# Patient Record
Sex: FEMALE | Race: AMERICAN INDIAN OR ALASKA NATIVE
[De-identification: names, ages, dates, MRNs, and addresses within clinical notes are randomized per-mention and may not be internally consistent; named-entity substitution may affect disease eponyms.]

---

## 2020-06-07 ENCOUNTER — HOSPITAL ENCOUNTER (EMERGENCY)
Dept: HOSPITAL 5 - ED | Age: 60
Discharge: HOME | End: 2020-06-07
Payer: COMMERCIAL

## 2020-06-07 VITALS — SYSTOLIC BLOOD PRESSURE: 157 MMHG | DIASTOLIC BLOOD PRESSURE: 79 MMHG

## 2020-06-07 DIAGNOSIS — R51: ICD-10-CM

## 2020-06-07 DIAGNOSIS — Z79.899: ICD-10-CM
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
## 2020-06-07 DIAGNOSIS — T78.49XA: Primary | ICD-10-CM

## 2020-06-07 DIAGNOSIS — Z88.8: ICD-10-CM

## 2020-06-07 PROCEDURE — 99282 EMERGENCY DEPT VISIT SF MDM: CPT

## 2020-06-07 PROCEDURE — 96372 THER/PROPH/DIAG INJ SC/IM: CPT

## 2021-01-05 ENCOUNTER — HOSPITAL ENCOUNTER (EMERGENCY)
Dept: HOSPITAL 5 - ED | Age: 61
Discharge: HOME | End: 2021-01-05
Payer: COMMERCIAL

## 2021-01-05 VITALS — DIASTOLIC BLOOD PRESSURE: 84 MMHG | SYSTOLIC BLOOD PRESSURE: 160 MMHG

## 2021-01-05 DIAGNOSIS — I10: ICD-10-CM

## 2021-01-05 DIAGNOSIS — Z79.899: ICD-10-CM

## 2021-01-05 DIAGNOSIS — M25.512: Primary | ICD-10-CM

## 2021-01-05 DIAGNOSIS — Z88.8: ICD-10-CM

## 2021-01-05 PROCEDURE — 99283 EMERGENCY DEPT VISIT LOW MDM: CPT

## 2021-01-05 NOTE — EMERGENCY DEPARTMENT REPORT
ED General Adult HPI





- General


Stated complaint: LT SHOULDER PAIN


Time Seen by Provider: 01/05/21 13:55





- History of Present Illness


Initial comments: 





60-year-old -American female patient presents with complaints of left 

shoulder pain x1 week.  She rates her pain as a 7/10 in severity and states it 

worsens with movement.  Patient denies any chest pain, shortness of breath, 

difficulty moving her left shoulder.  She states pain is relieved with i

buprofen.  No prior history of heart disease or family history of heart disease 

per patient.  Also states some intermittent tingling in her left hand.





- Related Data


                                  Previous Rx's











 Medication  Instructions  Recorded  Last Taken  Type


 


predniSONE [Deltasone] 20 mg PO QDAY 5 Days #5 tab 06/07/20 Unknown Rx


 


Naproxen 500 mg PO BID 7 Days #14 tablet 01/05/21 Unknown Rx


 


methOCARBAMOL [Robaxin TAB] 1,000 mg PO TID PRN #20 tab 01/05/21 Unknown Rx











                                    Allergies











Allergy/AdvReac Type Severity Reaction Status Date / Time


 


lisinopril Allergy  Unknown Verified 06/07/20 20:48


 


hair dye Allergy  Swelling Uncoded 06/07/20 20:48














ED Review of Systems


ROS: 


Stated complaint: LT SHOULDER PAIN


Other details as noted in HPI





Constitutional: denies: diaphoresis, fever, malaise, weakness


Respiratory: denies: cough, shortness of breath


Cardiovascular: denies: chest pain


Gastrointestinal: denies: abdominal pain


Musculoskeletal: denies: back pain


Neurological: paresthesias.  denies: headache, weakness, numbness





ED Past Medical Hx





- Past Medical History


Hx Hypertension: Yes





- Social History


Smoking Status: Never Smoker


Substance Use Type: None





- Medications


Home Medications: 


                                Home Medications











 Medication  Instructions  Recorded  Confirmed  Last Taken  Type


 


predniSONE [Deltasone] 20 mg PO QDAY 5 Days #5 tab 06/07/20  Unknown Rx


 


Naproxen 500 mg PO BID 7 Days #14 tablet 01/05/21  Unknown Rx


 


methOCARBAMOL [Robaxin TAB] 1,000 mg PO TID PRN #20 tab 01/05/21  Unknown Rx














ED Physical Exam





- General


General appearance: alert, in no apparent distress





- Head


Head exam: Present: atraumatic, normocephalic





- Eye


Eye exam: Present: normal appearance.  Absent: scleral icterus





- ENT


ENT exam: Present: mucous membranes moist





- Neck


Neck exam: Present: normal inspection, full ROM.  Absent: tenderness





- Respiratory


Respiratory exam: Present: normal lung sounds bilaterally.  Absent: respiratory 

distress





- Cardiovascular


Cardiovascular Exam: Present: regular rate, normal rhythm





- Extremities Exam


Extremities exam: Present: other (Tenderness to palpation noted over the left 

scapula without humeral head tenderness or obvious deformity; patient has full 

range of motion of the left shoulder and arm and hand; normal sensation and 

perfusion noted)





- Back Exam


Back exam: Present: full ROM





- Neurological Exam


Neurological exam: Present: alert, oriented X3, normal gait





- Psychiatric


Psychiatric exam: Present: normal affect, normal mood





- Skin


Skin exam: Present: warm, dry, intact, normal color.  Absent: rash





ED Course


                                   Vital Signs











  01/05/21





  13:26


 


Temperature 97.9 F


 


Pulse Rate 73


 


Respiratory 16





Rate 


 


Blood Pressure 160/84


 


O2 Sat by Pulse 100





Oximetry 














ED Medical Decision Making





- Medical Decision Making








60-year-old -American female patient presents with complaints of left 

shoulder pain x1 week.  She rates her pain as a 7/10 in severity and states it 

worsens with movement.  Patient denies any chest pain, shortness of breath, 

difficulty moving her left shoulder.  She states pain is relieved with 

ibuprofen.  No prior history of heart disease or family history of heart disease

per patient.  Also states some intermittent tingling in her left hand.


X-ray is normal.  Pain appears to be musculoskeletal in nature.  Will treat 

conservatively with NSAIDs and muscle relaxers and rest.  Recommend follow-up 

with her primary care doctor in 2 days.  Discussed signs and symptoms that 

should prompt immediate return to the emergency department in detail with 

patient who verbalized understanding.  Her vitals are normal, she is well-

appearing, she is stable for discharge home.


Critical care attestation.: 


If time is entered above; I have spent that time in minutes in the direct care 

of this critically ill patient, excluding procedure time.








ED Disposition


Clinical Impression: 


 Acute pain of left shoulder





Disposition: DC-01 TO HOME OR SELFCARE


Is pt being admited?: No


Condition: Stable


Instructions:  Shoulder Pain


Prescriptions: 


Naproxen 500 mg PO BID 7 Days #14 tablet


methOCARBAMOL [Robaxin TAB] 1,000 mg PO TID PRN #20 tab


 PRN Reason: muscle spasm/tightness


Forms:  Work/School Release Form(ED)

## 2021-01-05 NOTE — XRAY REPORT
LEFT SHOULDER 3 VIEWS



INDICATION / CLINICAL INFORMATION:

Left shoulder pain.



COMPARISON:

None available.

 

FINDINGS:

BONES/JOINT(S): No acute fracture or subluxation. Mild DJD in the AC joint. No focal bone lesions.



SOFT TISSUES: No significant abnormality.



ADDITIONAL FINDINGS: None.







Signer Name: Gonzales Adam MD 

Signed: 1/5/2021 2:24 PM

Workstation Name: [email or web address]

## 2021-09-09 ENCOUNTER — HOSPITAL ENCOUNTER (EMERGENCY)
Dept: HOSPITAL 5 - ED | Age: 61
Discharge: HOME | End: 2021-09-09
Payer: COMMERCIAL

## 2021-09-09 VITALS — SYSTOLIC BLOOD PRESSURE: 148 MMHG | DIASTOLIC BLOOD PRESSURE: 75 MMHG

## 2021-09-09 DIAGNOSIS — I10: ICD-10-CM

## 2021-09-09 DIAGNOSIS — K62.89: Primary | ICD-10-CM

## 2021-09-09 DIAGNOSIS — R10.30: ICD-10-CM

## 2021-09-09 DIAGNOSIS — Z88.8: ICD-10-CM

## 2021-09-09 DIAGNOSIS — R11.0: ICD-10-CM

## 2021-09-09 DIAGNOSIS — Z91.048: ICD-10-CM

## 2021-09-09 LAB
ALBUMIN SERPL-MCNC: 4.5 G/DL (ref 3.9–5)
ALT SERPL-CCNC: 11 UNITS/L (ref 7–56)
BACTERIA #/AREA URNS HPF: (no result) /HPF
BASOPHILS # (AUTO): 0 K/MM3 (ref 0–0.1)
BASOPHILS NFR BLD AUTO: 0.8 % (ref 0–1.8)
BILIRUB UR QL STRIP: (no result)
BLOOD UR QL VISUAL: (no result)
BUN SERPL-MCNC: 17 MG/DL (ref 7–17)
BUN/CREAT SERPL: 24 %
CALCIUM SERPL-MCNC: 8.8 MG/DL (ref 8.4–10.2)
EOSINOPHIL # BLD AUTO: 0.1 K/MM3 (ref 0–0.4)
EOSINOPHIL NFR BLD AUTO: 1.6 % (ref 0–4.3)
HCT VFR BLD CALC: 36.5 % (ref 30.3–42.9)
HEMOLYSIS INDEX: 4
HGB BLD-MCNC: 12.1 GM/DL (ref 10.1–14.3)
LYMPHOCYTES # BLD AUTO: 2.3 K/MM3 (ref 1.2–5.4)
LYMPHOCYTES NFR BLD AUTO: 40.1 % (ref 13.4–35)
MCHC RBC AUTO-ENTMCNC: 33 % (ref 30–34)
MCV RBC AUTO: 88 FL (ref 79–97)
MONOCYTES # (AUTO): 0.5 K/MM3 (ref 0–0.8)
MONOCYTES % (AUTO): 9 % (ref 0–7.3)
MUCOUS THREADS #/AREA URNS HPF: (no result) /HPF
PH UR STRIP: 6 [PH] (ref 5–7)
PLATELET # BLD: 291 K/MM3 (ref 140–440)
PROT UR STRIP-MCNC: (no result) MG/DL
RBC # BLD AUTO: 4.15 M/MM3 (ref 3.65–5.03)
RBC #/AREA URNS HPF: 7 /HPF (ref 0–6)
UROBILINOGEN UR-MCNC: < 2 MG/DL (ref ?–2)
WBC #/AREA URNS HPF: 1 /HPF (ref 0–6)

## 2021-09-09 PROCEDURE — 74177 CT ABD & PELVIS W/CONTRAST: CPT

## 2021-09-09 PROCEDURE — 96361 HYDRATE IV INFUSION ADD-ON: CPT

## 2021-09-09 PROCEDURE — 36415 COLL VENOUS BLD VENIPUNCTURE: CPT

## 2021-09-09 PROCEDURE — 81001 URINALYSIS AUTO W/SCOPE: CPT

## 2021-09-09 PROCEDURE — 99284 EMERGENCY DEPT VISIT MOD MDM: CPT

## 2021-09-09 PROCEDURE — 83690 ASSAY OF LIPASE: CPT

## 2021-09-09 PROCEDURE — 96375 TX/PRO/DX INJ NEW DRUG ADDON: CPT

## 2021-09-09 PROCEDURE — 80053 COMPREHEN METABOLIC PANEL: CPT

## 2021-09-09 PROCEDURE — 85025 COMPLETE CBC W/AUTO DIFF WBC: CPT

## 2021-09-09 PROCEDURE — 96374 THER/PROPH/DIAG INJ IV PUSH: CPT

## 2021-09-09 NOTE — CAT SCAN REPORT
CT ABDOMEN AND PELVIS WITH CONTRAST



HISTORY: LOWER ABD & RECTAL PAIN X2WKS [id number] 100ML.



COMPARISON: None.



TECHNIQUE: CT images of the abdomen and pelvis were obtained following administration of intravenous 
contrast.  All CT scans at this location are performed using CT dose reduction for ALARA by means of 
automated exposure control. 



CONTRAST: 100 ml of intravenous contrast administered.



FINDINGS:



Lungs/bones:  Lung bases are clear



Abdomen/pelvis:  There is a hypodensity within the right hepatic lobe measuring 1.6 cm. Liver is enla
rged. There is diffuse fatty infiltration of the liver. There is mild thickening of the distal stomac
h wall and into the pylorus, nonspecific. Adrenal glands appear normal. Pancreatic duct is slightly p
rominent in the proximal pancreas and pancreatic head and uncinate process measuring 6 mm however no 
well-defined masses seen. Portal vein is patent. Gallbladder appears normal. Appendix appears normal.




There is a low-density lesion within the right pelvis measuring 2.1 x 1.1 cm on axial image 105. This
 appears separate from the bowel loops.



There is thickening of the distal sigmoid colon with thickening of the rectum. No well-defined masses
 definitely seen however there is some asymmetry to the rectal wall left greater than right. Urinary 
bladder appears normal. Aorta appears normal. No bowel obstruction is seen.



IMPRESSION:

1. Thickening of the distal sigmoid colon with thickening of the rectum. There is some asymmetry norm
al left wall of the rectum. A follow-up with GI for colonoscopy and visualization to exclude rectal m
ass or malignancy is recommended.

2. Hypodense lesion within the right pelvic sidewall on the right measuring 1.1 x 2.1 cm. Fines could
 represent enlarged node. Follow-up recommended.

3. No bowel obstruction is seen.

4. Hypodense lesion within the right hepatic lobe measuring 1.6 cm. The liver is enlarged with hetero
geneous throughout. Additional small hypodense lesion adjacent to the larger hypodense lesion is seen
. Findings could represent a cyst however follow-up is recommended to exclude underlying liver lesion
.



Signer Name: Barrie Mooney MD 

Signed: 9/9/2021 6:51 PM

Workstation Name: [email or web address]

## 2021-09-09 NOTE — EMERGENCY DEPARTMENT REPORT
ED Abdominal Pain HPI





- General


Chief Complaint: Abdominal Pain


Stated Complaint: RECTAL PAIN


Time Seen by Provider: 21 15:40


Source: patient


Mode of arrival: Ambulatory


Limitations: No Limitations





- History of Present Illness


Initial Comments: 





This is a 61-year-old female nontoxic, well nourished in appearance, no acute 

signs of distress presents to the ED with c/o of nausea, constipation, rectal 

pain, and abdominal pain  several days.   Patient denies any vomiting.  Patient

describes abdominal pain as cramping and aching with level of 3/10 primarily to 

lower bilateral abdomen.  Patient denies chest pain, short of breath, fever, 

hemoptysis, blood in stool, chills, headache, stiff neck, numbness or tingling. 

Patient denies any diarrhea.  Denies any blood in stool.  Patient denies any 

recent travels.  Patient stated allergies to lisinopril.


MD Complaint: abdominal pain


-: days(s)


Location: LLQ, RLQ


Radiation: none


Migration to: no migration


Severity: mild


Severity scale (0 -10): 3


Quality: cramping


Consistency: intermittent


Improves With: nothing


Worsens With: nothing


Associated Symptoms: nausea, constipation.  denies: vomiting, diarrhea, fever, 

chills, dysuria, hematemesis, hematochezia, melena, hematuria, anorexia, syncope





- Related Data


                                  Previous Rx's











 Medication  Instructions  Recorded  Last Taken  Type


 


predniSONE [Deltasone] 20 mg PO QDAY 5 Days #5 tab 20 Unknown Rx


 


Naproxen 500 mg PO BID 7 Days #14 tablet 21 Unknown Rx


 


methOCARBAMOL [Robaxin TAB] 1,000 mg PO TID PRN #20 tab 21 Unknown Rx


 


Ondansetron [Zofran Odt] 4 mg PO Q12H PRN #12 tab.rapdis 21 Unknown Rx











                                    Allergies











Allergy/AdvReac Type Severity Reaction Status Date / Time


 


lisinopril Allergy  Unknown Verified 21 15:36


 


hair dye Allergy  Swelling Uncoded 21 15:36














ED Review of Systems


ROS: 


Stated complaint: RECTAL PAIN


Other details as noted in HPI





Comment: All other systems reviewed and negative


Constitutional: denies: chills, fever


Eyes: denies: eye pain, eye discharge, vision change


ENT: denies: ear pain, throat pain


Respiratory: denies: cough, shortness of breath, wheezing


Cardiovascular: denies: chest pain, palpitations


Endocrine: no symptoms reported


Gastrointestinal: abdominal pain, nausea, constipation.  denies: vomiting, 

diarrhea, hematemesis, melena, hematochezia


Genitourinary: denies: urgency, dysuria, discharge


Musculoskeletal: denies: back pain, joint swelling, arthralgia


Skin: denies: rash, lesions


Neurological: denies: headache, weakness, paresthesias


Psychiatric: denies: anxiety, depression


Hematological/Lymphatic: denies: easy bleeding, easy bruising





ED Past Medical Hx





- Past Medical History


Hx Hypertension: Yes





- Social History


Smoking Status: Never Smoker


Substance Use Type: None





- Medications


Home Medications: 


                                Home Medications











 Medication  Instructions  Recorded  Confirmed  Last Taken  Type


 


predniSONE [Deltasone] 20 mg PO QDAY 5 Days #5 tab 20  Unknown Rx


 


Naproxen 500 mg PO BID 7 Days #14 tablet 21  Unknown Rx


 


methOCARBAMOL [Robaxin TAB] 1,000 mg PO TID PRN #20 tab 21  Unknown Rx


 


Ondansetron [Zofran Odt] 4 mg PO Q12H PRN #12 tab.rapdis 21  Unknown Rx














ED Physical Exam





- General


Limitations: No Limitations


General appearance: alert, in no apparent distress





- Head


Head exam: Present: atraumatic, normocephalic





- Eye


Eye exam: Present: normal appearance





- Neck


Neck exam: Present: normal inspection, full ROM.  Absent: tenderness, 

meningismus, lymphadenopathy





- Respiratory


Respiratory exam: Present: normal lung sounds bilaterally.  Absent: respiratory 

distress, wheezes, rales, rhonchi, stridor, chest wall tenderness, accessory 

muscle use, decreased breath sounds, prolonged expiratory





- Cardiovascular


Cardiovascular Exam: Present: regular rate, normal rhythm, normal heart sounds. 

Absent: bradycardia, tachycardia, irregular rhythm, systolic murmur, diastolic 

murmur, rubs, gallop





- GI/Abdominal


GI/Abdominal exam: Present: soft, tenderness (bilateral lower abdominal area), 

normal bowel sounds.  Absent: distended, guarding, rebound, rigid, diminished 

bowel sounds, organomegaly, mass, bruit, pulsatile mass





- Rectal


Rectal exam: Present: normal inspection, normal rectal tone, other (Angelika Sol RN present during exam).  Absent: decreased rectal tone, heme (+) stool, 

black stool, bloody stool, fecal impaction, hemorrhoids, mass, tenderness





- Extremities Exam


Extremities exam: Present: normal inspection, full ROM, normal capillary refill.

 Absent: tenderness





- Back Exam


Back exam: Present: normal inspection, full ROM.  Absent: tenderness, CVA 

tenderness (R), CVA tenderness (L), muscle spasm, paraspinal tenderness, 

vertebral tenderness, rash noted





- Neurological Exam


Neurological exam: Present: alert, oriented X3, normal gait





- Psychiatric


Psychiatric exam: Present: normal affect, normal mood





- Skin


Skin exam: Present: warm, dry, intact, normal color.  Absent: rash





ED Course


                                   Vital Signs











  21





  15:34 17:32 18:12


 


Temperature 97.8 F 98.1 F 


 


Pulse Rate 74 66 


 


Respiratory 16 12 14





Rate   


 


Blood Pressure  146/88 


 


Blood Pressure 176/88  





[Left]   


 


O2 Sat by Pulse 100 100 





Oximetry   














- Reevaluation(s)


Reevaluation #1: 





21 16:18


Patient is speaking in full sentences with no signs of distress noted.





ED Medical Decision Making





- Lab Data


Result diagrams: 


                                 21 16:01





                                 21 16:01








                                   Lab Results











  21 Range/Units





  16:01 16:01 Unknown 


 


WBC  5.7    (4.5-11.0)  K/mm3


 


RBC  4.15    (3.65-5.03)  M/mm3


 


Hgb  12.1    (10.1-14.3)  gm/dl


 


Hct  36.5    (30.3-42.9)  %


 


MCV  88    (79-97)  fl


 


MCH  29    (28-32)  pg


 


MCHC  33    (30-34)  %


 


RDW  14.4    (13.2-15.2)  %


 


Plt Count  291    (140-440)  K/mm3


 


Lymph % (Auto)  40.1 H    (13.4-35.0)  %


 


Mono % (Auto)  9.0 H    (0.0-7.3)  %


 


Eos % (Auto)  1.6    (0.0-4.3)  %


 


Baso % (Auto)  0.8    (0.0-1.8)  %


 


Lymph # (Auto)  2.3    (1.2-5.4)  K/mm3


 


Mono # (Auto)  0.5    (0.0-0.8)  K/mm3


 


Eos # (Auto)  0.1    (0.0-0.4)  K/mm3


 


Baso # (Auto)  0.0    (0.0-0.1)  K/mm3


 


Seg Neutrophils %  48.5    (40.0-70.0)  %


 


Seg Neutrophils #  2.7    (1.8-7.7)  K/mm3


 


Sodium   143   (137-145)  mmol/L


 


Potassium   4.4   (3.6-5.0)  mmol/L


 


Chloride   107.1 H   ()  mmol/L


 


Carbon Dioxide   26   (22-30)  mmol/L


 


Anion Gap   14   mmol/L


 


BUN   17   (7-17)  mg/dL


 


Creatinine   0.7   (0.6-1.2)  mg/dL


 


Estimated GFR   > 60   ml/min


 


BUN/Creatinine Ratio   24   %


 


Glucose   94   ()  mg/dL


 


Calcium   8.8   (8.4-10.2)  mg/dL


 


Total Bilirubin   0.20   (0.1-1.2)  mg/dL


 


AST   13   (5-40)  units/L


 


ALT   11   (7-56)  units/L


 


Alkaline Phosphatase   70   ()  units/L


 


Total Protein   7.6   (6.3-8.2)  g/dL


 


Albumin   4.5   (3.9-5)  g/dL


 


Albumin/Globulin Ratio   1.5   %


 


Lipase   23   (13-60)  units/L


 


Urine Color    Straw  (Yellow)  


 


Urine Turbidity    Clear  (Clear)  


 


Urine pH    6.0  (5.0-7.0)  


 


Ur Specific Gravity    1.031 H  (1.003-1.030)  


 


Urine Protein    <15 mg/dl  (Negative)  mg/dL


 


Urine Glucose (UA)    Neg  (Negative)  mg/dL


 


Urine Ketones    Tr  (Negative)  mg/dL


 


Urine Blood    Neg  (Negative)  


 


Urine Nitrite    Neg  (Negative)  


 


Urine Bilirubin    Neg  (Negative)  


 


Urine Urobilinogen    < 2.0  (<2.0)  mg/dL


 


Ur Leukocyte Esterase    Neg  (Negative)  


 


Urine WBC (Auto)    1.0  (0.0-6.0)  /HPF


 


Urine RBC (Auto)    7.0  (0.0-6.0)  /HPF


 


Urine Bacteria (Auto)    1+  (Negative)  /HPF


 


Urine Mucus    Few  /HPF














- Radiology Data





[facility] [address] [place] [address] Cat

 Scan Report Signed Patient: BRANDYN SEVERINO MR#: [id number] :  Acct:[id number] Age/Sex: 61 / F ADM Date: 21 Loc: ED Attending 

Dr: Ordering Physician: SUZETTE GALEANA NP Date of Service: 21 

Procedure(s): CT abdomen pelvis w con Accession Number(s): [id number] cc: SUZETTE GALEANA NP CT ABDOMEN AND PELVIS WITH CONTRAST HISTORY: LOWER ABD RECTAL PAIN 

X2WKS [id number] 100ML. COMPARISON: None. TECHNIQUE: CT images of the abdomen and 

pelvis were obtained following administration of intravenous contrast. All CT 

scans at this location are performed using CT dose reduction for ALARA by means 

of automated exposure control. CONTRAST: 100 ml of intravenous contrast 

administered. FINDINGS: Lungs/bones: Lung bases are clear Abdomen/pelvis: There 

is a hypodensity within the right hepatic lobe measuring 1.6 cm. Liver is 

enlarged. There is diffuse fatty infiltration of the liver. There is mild 

thickening of the distal stomach wall and into the pylorus, nonspecific. Adrenal

 glands appear normal. Pancreatic duct is slightly prominent in the proximal pa

ncreas and pancreatic head and uncinate process measuring 6 mm however no well-

defined masses seen. Portal vein is patent. Gallbladder appears normal. Appendix

 appears normal. There is a low-density lesion within the right pelvis measuring

 2.1 x 1.1 cm on axial image 105. This appears separate from the bowel loops. 

There is thickening of the distal sigmoid colon with thickening of the rectum. 

No well-defined masses definitely seen however there is some asymmetry to the 

rectal wall left greater than right. Urinary bladder appears normal. Aorta 

appears normal. No bowel obstruction is seen. IMPRESSION: 1. Thickening of the 

distal sigmoid colon with thickening of the rectum. There is some asymmetry 

normal left wall of the rectum. A follow-up with GI for colonoscopy and 

visualization to exclude rectal mass or malignancy is recommended. 2. Hypodense 

lesion within the right pelvic sidewall on the right measuring 1.1 x 2.1 cm. 

Fines could represent enlarged node. Follow-up recommended. 3. No bowel 

obstruction is seen. 4. Hypodense lesion within the right hepatic lobe measuring

 1.6 cm. The liver is enlarged with heterogeneous throughout. Additional small 

hypodense lesion adjacent to the larger hypodense lesion is seen. Findings could

 represent a cyst however follow-up is recommended to exclude underlying liver 

lesion. Signer Name: Barrie Mooney MD Signed: 2021 6:51 PM Workstation 

Name: [email or web address]- Transcribed By: CHAS Dictated By: BARBARA MOONEY MD 

Electronically Authenticated By: BARBARA MOONEY MD Signed Date/Time: 

21 DD/DT: 21 TD/TT:





- Medical Decision Making





This is a 61-year-old female that presents with abdominal pain and nausea.  

Patient is stable and was examined by me.  Labs obtained. UA obtained. CT of 

abdomen obtained and dictated by the radiologist. Patient is notified of the 

report with no questions noted by the patient. Vital signs are stable prior to 

discharge.  Patient received medical treatment in the ED which patient stated 

symptoms has resovled and subsided.  Was instructed note to operate any 

machinery due to possible drowsiness and stated someone will drive the patient 

home. A by mouth challenge has been obtained and patient tolerated well with no 

nausea vomiting. Patient was also instructed to Follow-up with a primary care 

and gastroenterologist doctor in 3-5 days or if symptoms worsen and continue 

return to emergency room as soon as possible.  At time of discharge, the patient

 does not seem toxic or ill in appearance.  No acute signs of distress noted.  

Patient agrees to discharge treatment plan of care.  No further questions noted 

by the patient.


Critical care attestation.: 


If time is entered above; I have spent that time in minutes in the direct care 

of this critically ill patient, excluding procedure time.








ED Disposition


Clinical Impression: 


 Nausea, Mass in rectum





Abdominal pain, unspecified site


Qualifiers:


 Abdominal location: lower abdomen, unspecified Qualified Code(s): R10.30 - 

Lower abdominal pain, unspecified





Disposition: 01 HOME / SELF CARE / HOMELESS


Is pt being admited?: No


Does the pt Need Aspirin: No


Condition: Stable


Instructions:  Abdominal Pain (ED)


Additional Instructions: 


Follow-up with a primary care and gastroenterologist doctor in 3-5 days or if 

symptoms worsen and continue return to emergency room as soon as possible.  


Prescriptions: 


Ondansetron [Zofran Odt] 4 mg PO Q12H PRN #12 tab.rapdis


 PRN Reason: Nausea


Referrals: 


PRIMARY CARE,MD [Referring] - 3-5 Days


AMYELIN LEON MD [Staff Physician] - 3-5 Days


[facility] [Provider Group] - 3-5 Days


Forms:  Work/School Release Form(ED)


Time of Disposition: 20:08

## 2021-09-17 ENCOUNTER — DASHBOARD ENCOUNTERS (OUTPATIENT)
Age: 61
End: 2021-09-17

## 2021-09-17 ENCOUNTER — OFFICE VISIT (OUTPATIENT)
Dept: URBAN - METROPOLITAN AREA CLINIC 118 | Facility: CLINIC | Age: 61
End: 2021-09-17
Payer: COMMERCIAL

## 2021-09-17 ENCOUNTER — LAB OUTSIDE AN ENCOUNTER (OUTPATIENT)
Dept: URBAN - METROPOLITAN AREA CLINIC 118 | Facility: CLINIC | Age: 61
End: 2021-09-17

## 2021-09-17 DIAGNOSIS — K59.04 CHRONIC IDIOPATHIC CONSTIPATION: ICD-10-CM

## 2021-09-17 DIAGNOSIS — R93.5 ABNORMAL CT OF THE ABDOMEN: ICD-10-CM

## 2021-09-17 DIAGNOSIS — K62.89 RECTAL PAIN: ICD-10-CM

## 2021-09-17 PROBLEM — 82934008: Status: ACTIVE | Noted: 2021-09-17

## 2021-09-17 PROCEDURE — 99204 OFFICE O/P NEW MOD 45 MIN: CPT | Performed by: INTERNAL MEDICINE

## 2021-09-17 NOTE — PHYSICAL EXAM GASTROINTESTINAL
Abdomen , soft, nontender, nondistended , no guarding or rigidity , no masses palpable , normal bowel sounds , Rectal exam  Chaperone present during the exam Normal external exam  No rectal mass palpated, brown stools.

## 2021-09-17 NOTE — HPI-TODAY'S VISIT:
This is a 62 yo female with pmh of anxiety and chronic constipation.  Patient reports having chronic constipation and has tried multiple laxatives in the past. Last week, she had worsening rectal pain. Pain was not improving with prep-H for hemorrhoids. She was seen at [facility] ED. CT a/p showed abnormal thickening of sigmoid and rectal wall with asymmetry along with a lesion in the liver.  Denies any weight loss or rectal bleeding.

## 2021-09-21 ENCOUNTER — OFFICE VISIT (OUTPATIENT)
Dept: URBAN - METROPOLITAN AREA SURGERY CENTER 23 | Facility: SURGERY CENTER | Age: 61
End: 2021-09-21
Payer: COMMERCIAL

## 2021-09-21 DIAGNOSIS — R93.3 ABN FINDINGS-GI TRACT: ICD-10-CM

## 2021-09-21 DIAGNOSIS — K63.5 BENIGN COLON POLYP: ICD-10-CM

## 2021-09-21 PROCEDURE — G8907 PT DOC NO EVENTS ON DISCHARG: HCPCS | Performed by: INTERNAL MEDICINE

## 2021-09-21 PROCEDURE — 45380 COLONOSCOPY AND BIOPSY: CPT | Performed by: INTERNAL MEDICINE

## 2022-04-30 ENCOUNTER — TELEPHONE ENCOUNTER (OUTPATIENT)
Dept: URBAN - METROPOLITAN AREA CLINIC 121 | Facility: CLINIC | Age: 62
End: 2022-04-30

## 2022-05-01 ENCOUNTER — TELEPHONE ENCOUNTER (OUTPATIENT)
Dept: URBAN - METROPOLITAN AREA CLINIC 121 | Facility: CLINIC | Age: 62
End: 2022-05-01

## 2022-05-01 RX ORDER — OMEPRAZOLE 20 MG/1
TABLET, DELAYED RELEASE ORAL
Status: ACTIVE | COMMUNITY
Start: 2012-03-05

## 2022-05-01 RX ORDER — TEA TREE OIL 100 %
SPRAY, NON-AEROSOL (ML) TOPICAL
Status: ACTIVE | COMMUNITY
Start: 2012-03-05